# Patient Record
Sex: MALE | Race: WHITE | NOT HISPANIC OR LATINO | Employment: FULL TIME | URBAN - METROPOLITAN AREA
[De-identification: names, ages, dates, MRNs, and addresses within clinical notes are randomized per-mention and may not be internally consistent; named-entity substitution may affect disease eponyms.]

---

## 2019-01-22 ENCOUNTER — APPOINTMENT (EMERGENCY)
Dept: CT IMAGING | Facility: HOSPITAL | Age: 24
End: 2019-01-22
Payer: COMMERCIAL

## 2019-01-22 ENCOUNTER — HOSPITAL ENCOUNTER (EMERGENCY)
Facility: HOSPITAL | Age: 24
End: 2019-01-22
Attending: EMERGENCY MEDICINE
Payer: COMMERCIAL

## 2019-01-22 VITALS
HEIGHT: 69 IN | BODY MASS INDEX: 23.7 KG/M2 | HEART RATE: 66 BPM | SYSTOLIC BLOOD PRESSURE: 124 MMHG | RESPIRATION RATE: 18 BRPM | WEIGHT: 160 LBS | DIASTOLIC BLOOD PRESSURE: 59 MMHG | OXYGEN SATURATION: 100 % | TEMPERATURE: 97.8 F
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
## 2019-01-22 LAB
ANION GAP SERPL CALCULATED.3IONS-SCNC: 7 MMOL/L (ref 4–13)
BASOPHILS # BLD AUTO: 0.04 THOUSANDS/ΜL (ref 0–0.1)
BASOPHILS NFR BLD AUTO: 0 % (ref 0–1)
BUN SERPL-MCNC: 6 MG/DL (ref 5–25)
CALCIUM SERPL-MCNC: 9 MG/DL (ref 8.3–10.1)
CHLORIDE SERPL-SCNC: 101 MMOL/L (ref 100–108)
CO2 SERPL-SCNC: 30 MMOL/L (ref 21–32)
CREAT SERPL-MCNC: 0.78 MG/DL (ref 0.6–1.3)
EOSINOPHIL # BLD AUTO: 0.08 THOUSAND/ΜL (ref 0–0.61)
EOSINOPHIL NFR BLD AUTO: 1 % (ref 0–6)
ERYTHROCYTE [DISTWIDTH] IN BLOOD BY AUTOMATED COUNT: 12.8 % (ref 11.6–15.1)
GFR SERPL CREATININE-BSD FRML MDRD: 125 ML/MIN/1.73SQ M
GLUCOSE SERPL-MCNC: 92 MG/DL (ref 65–140)
HCT VFR BLD AUTO: 38 % (ref 36.5–49.3)
HGB BLD-MCNC: 12.8 G/DL (ref 12–17)
IMM GRANULOCYTES # BLD AUTO: 0.04 THOUSAND/UL (ref 0–0.2)
IMM GRANULOCYTES NFR BLD AUTO: 0 % (ref 0–2)
LYMPHOCYTES # BLD AUTO: 1.64 THOUSANDS/ΜL (ref 0.6–4.47)
LYMPHOCYTES NFR BLD AUTO: 16 % (ref 14–44)
MCH RBC QN AUTO: 28.8 PG (ref 26.8–34.3)
MCHC RBC AUTO-ENTMCNC: 33.7 G/DL (ref 31.4–37.4)
MCV RBC AUTO: 85 FL (ref 82–98)
MONOCYTES # BLD AUTO: 0.71 THOUSAND/ΜL (ref 0.17–1.22)
MONOCYTES NFR BLD AUTO: 7 % (ref 4–12)
NEUTROPHILS # BLD AUTO: 7.46 THOUSANDS/ΜL (ref 1.85–7.62)
NEUTS SEG NFR BLD AUTO: 76 % (ref 43–75)
NRBC BLD AUTO-RTO: 0 /100 WBCS
PLATELET # BLD AUTO: 190 THOUSANDS/UL (ref 149–390)
PMV BLD AUTO: 11.2 FL (ref 8.9–12.7)
POTASSIUM SERPL-SCNC: 4.1 MMOL/L (ref 3.5–5.3)
RBC # BLD AUTO: 4.45 MILLION/UL (ref 3.88–5.62)
SODIUM SERPL-SCNC: 138 MMOL/L (ref 136–145)
WBC # BLD AUTO: 9.97 THOUSAND/UL (ref 4.31–10.16)

## 2019-01-22 PROCEDURE — 99285 EMERGENCY DEPT VISIT HI MDM: CPT

## 2019-01-22 PROCEDURE — 36415 COLL VENOUS BLD VENIPUNCTURE: CPT | Performed by: PHYSICIAN ASSISTANT

## 2019-01-22 PROCEDURE — 74177 CT ABD & PELVIS W/CONTRAST: CPT

## 2019-01-22 PROCEDURE — 80048 BASIC METABOLIC PNL TOTAL CA: CPT | Performed by: PHYSICIAN ASSISTANT

## 2019-01-22 PROCEDURE — 71260 CT THORAX DX C+: CPT

## 2019-01-22 PROCEDURE — 85025 COMPLETE CBC W/AUTO DIFF WBC: CPT | Performed by: PHYSICIAN ASSISTANT

## 2019-01-22 RX ORDER — OXYCODONE HYDROCHLORIDE 5 MG/1
5 TABLET ORAL ONCE
Status: COMPLETED | OUTPATIENT
Start: 2019-01-22 | End: 2019-01-22

## 2019-01-22 RX ORDER — CYCLOBENZAPRINE HCL 10 MG
10 TABLET ORAL ONCE
Status: COMPLETED | OUTPATIENT
Start: 2019-01-22 | End: 2019-01-22

## 2019-01-22 RX ADMIN — OXYCODONE HYDROCHLORIDE 5 MG: 5 TABLET ORAL at 20:40

## 2019-01-22 RX ADMIN — IOHEXOL 100 ML: 350 INJECTION, SOLUTION INTRAVENOUS at 21:24

## 2019-01-22 RX ADMIN — CYCLOBENZAPRINE HYDROCHLORIDE 10 MG: 10 TABLET, FILM COATED ORAL at 20:40

## 2019-01-23 ENCOUNTER — HOSPITAL ENCOUNTER (OUTPATIENT)
Facility: HOSPITAL | Age: 24
Setting detail: OBSERVATION
Discharge: HOME | End: 2019-01-23
Attending: SURGERY | Admitting: SURGERY
Payer: COMMERCIAL

## 2019-01-23 VITALS
OXYGEN SATURATION: 100 % | TEMPERATURE: 97.9 F | HEART RATE: 64 BPM | RESPIRATION RATE: 18 BRPM | HEIGHT: 72 IN | WEIGHT: 159.8 LBS | BODY MASS INDEX: 21.64 KG/M2 | SYSTOLIC BLOOD PRESSURE: 105 MMHG | DIASTOLIC BLOOD PRESSURE: 63 MMHG
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
## 2019-01-23 DIAGNOSIS — R18.8 FREE FLUID IN PELVIS: ICD-10-CM
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
## 2019-01-23 LAB
ERYTHROCYTE [DISTWIDTH] IN BLOOD BY AUTOMATED COUNT: 13 % (ref 11.6–15.1)
HCT VFR BLD AUTO: 35.9 % (ref 36.5–49.3)
HCT VFR BLD AUTO: 37.5 % (ref 36.5–49.3)
HGB BLD-MCNC: 12.1 G/DL (ref 12–17)
HGB BLD-MCNC: 12.3 G/DL (ref 12–17)
MCH RBC QN AUTO: 28.9 PG (ref 26.8–34.3)
MCHC RBC AUTO-ENTMCNC: 32.8 G/DL (ref 31.4–37.4)
MCV RBC AUTO: 88 FL (ref 82–98)
PLATELET # BLD AUTO: 174 THOUSANDS/UL (ref 149–390)
PMV BLD AUTO: 11.9 FL (ref 8.9–12.7)
RBC # BLD AUTO: 4.26 MILLION/UL (ref 3.88–5.62)
WBC # BLD AUTO: 5.7 THOUSAND/UL (ref 4.31–10.16)

## 2019-01-23 PROCEDURE — 99219 PR INITIAL OBSERVATION CARE/DAY 50 MINUTES: CPT | Performed by: SURGERY

## 2019-01-23 PROCEDURE — 99285 EMERGENCY DEPT VISIT HI MDM: CPT

## 2019-01-23 PROCEDURE — 85018 HEMOGLOBIN: CPT | Performed by: SURGERY

## 2019-01-23 PROCEDURE — 85027 COMPLETE CBC AUTOMATED: CPT | Performed by: SURGERY

## 2019-01-23 PROCEDURE — 36415 COLL VENOUS BLD VENIPUNCTURE: CPT | Performed by: SURGERY

## 2019-01-23 PROCEDURE — 85014 HEMATOCRIT: CPT | Performed by: SURGERY

## 2019-01-23 RX ORDER — CLOTRIMAZOLE AND BETAMETHASONE DIPROPIONATE 10; .64 MG/G; MG/G
CREAM TOPICAL
COMMUNITY
Start: 2017-12-29

## 2019-01-23 RX ORDER — ARIPIPRAZOLE 2 MG/1
2 TABLET ORAL
COMMUNITY

## 2019-01-23 RX ORDER — AMOXICILLIN 250 MG
1 CAPSULE ORAL
Status: DISCONTINUED | OUTPATIENT
Start: 2019-01-23 | End: 2019-01-23 | Stop reason: HOSPADM

## 2019-01-23 RX ORDER — FAMOTIDINE 20 MG/1
40 TABLET, FILM COATED ORAL
Status: DISCONTINUED | OUTPATIENT
Start: 2019-01-23 | End: 2019-01-23 | Stop reason: HOSPADM

## 2019-01-23 RX ORDER — ESOMEPRAZOLE MAGNESIUM 40 MG/1
40 CAPSULE, DELAYED RELEASE ORAL 2 TIMES DAILY
COMMUNITY
End: 2020-03-01

## 2019-01-23 RX ORDER — ACETAMINOPHEN 325 MG/1
975 TABLET ORAL EVERY 8 HOURS PRN
Status: DISCONTINUED | OUTPATIENT
Start: 2019-01-23 | End: 2019-01-23 | Stop reason: HOSPADM

## 2019-01-23 RX ORDER — CLOTRIMAZOLE AND BETAMETHASONE DIPROPIONATE 10; .64 MG/G; MG/G
CREAM TOPICAL 2 TIMES DAILY
Status: DISCONTINUED | OUTPATIENT
Start: 2019-01-23 | End: 2019-01-23 | Stop reason: HOSPADM

## 2019-01-23 RX ORDER — ACETAMINOPHEN 325 MG/1
500 TABLET ORAL EVERY 4 HOURS PRN
Qty: 30 TABLET | Refills: 0
Start: 2019-01-23

## 2019-01-23 RX ORDER — SODIUM CHLORIDE 9 MG/ML
125 INJECTION, SOLUTION INTRAVENOUS CONTINUOUS
Status: DISCONTINUED | OUTPATIENT
Start: 2019-01-23 | End: 2019-01-23

## 2019-01-23 RX ORDER — NICOTINE 21 MG/24HR
1 PATCH, TRANSDERMAL 24 HOURS TRANSDERMAL DAILY
Status: DISCONTINUED | OUTPATIENT
Start: 2019-01-23 | End: 2019-01-23 | Stop reason: HOSPADM

## 2019-01-23 RX ORDER — RANITIDINE 300 MG/1
300 TABLET ORAL
COMMUNITY
End: 2020-03-01

## 2019-01-23 RX ORDER — ONDANSETRON 2 MG/ML
4 INJECTION INTRAMUSCULAR; INTRAVENOUS EVERY 8 HOURS PRN
Status: DISCONTINUED | OUTPATIENT
Start: 2019-01-23 | End: 2019-01-23 | Stop reason: HOSPADM

## 2019-01-23 RX ORDER — ESCITALOPRAM OXALATE 20 MG/1
20 TABLET ORAL DAILY
COMMUNITY
End: 2020-03-01

## 2019-01-23 RX ORDER — DOCUSATE SODIUM 100 MG/1
100 CAPSULE, LIQUID FILLED ORAL 2 TIMES DAILY
Status: DISCONTINUED | OUTPATIENT
Start: 2019-01-23 | End: 2019-01-23 | Stop reason: HOSPADM

## 2019-01-23 RX ORDER — PANTOPRAZOLE SODIUM 40 MG/1
40 TABLET, DELAYED RELEASE ORAL
Status: DISCONTINUED | OUTPATIENT
Start: 2019-01-23 | End: 2019-01-23 | Stop reason: HOSPADM

## 2019-01-23 RX ORDER — ESCITALOPRAM OXALATE 20 MG/1
20 TABLET ORAL DAILY
Status: DISCONTINUED | OUTPATIENT
Start: 2019-01-23 | End: 2019-01-23 | Stop reason: HOSPADM

## 2019-01-23 RX ADMIN — SODIUM CHLORIDE 125 ML/HR: 0.9 INJECTION, SOLUTION INTRAVENOUS at 02:16

## 2019-01-23 RX ADMIN — ACETAMINOPHEN 975 MG: 325 TABLET ORAL at 07:28

## 2019-01-23 RX ADMIN — ESCITALOPRAM OXALATE 20 MG: 20 TABLET ORAL at 10:17
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
## 2019-01-23 NOTE — SOCIAL WORK
Cm met with pt to discuss the role of CM  Pt lives with his mother in a 1 story home which has 10STE  Pt works, drives, and was fully independent PTA  Pt owns no DME or living will  Pt's [facility] is [facility]  Pt reports having depression and anxiety which is managed by his Psychiatrist Rita Tate  Pt reports no hx of substance abuse or IP rehab  Pt's mother will transport home  Pt provided CM with his medical insurance cards and [facility] claim, all of which were sent to billing  CM will follow up  CM reviewed d/c planning process including the following: identifying help at home, patient preference for d/c planning needs, Discharge Lounge, [facility] Meds to Bed program, availability of treatment team to discuss questions or concerns patient and/or family may have regarding understanding medications and recognizing signs and symptoms once discharged  CM also encouraged patient to follow up with all recommended appointments after discharge  Patient advised of importance for patient and family to participate in managing patients medical well being
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
## 2019-01-23 NOTE — ED NOTES
[facility]  hospitals-ED  Dr Jaspreet Mitchell is accepting   [address] [facility]  01/22/19 6526

## 2019-01-23 NOTE — ASSESSMENT & PLAN NOTE
CT chest abdomen pelvis demonstrated free fluid in pelvis and perisplenic area  Left-sided flank pain and left upper quadrant abdominal pain  H&H stable repeat 12 1 from 12 8  Pain management  Serial abdominal exams  Possible discharge today
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
## 2019-10-27 ENCOUNTER — HOSPITAL ENCOUNTER (EMERGENCY)
Facility: HOSPITAL | Age: 24
Discharge: HOME | End: 2019-10-28
Attending: EMERGENCY MEDICINE | Admitting: EMERGENCY MEDICINE
Payer: COMMERCIAL

## 2019-10-27 DIAGNOSIS — R07.89 CHEST WALL PAIN: ICD-10-CM

## 2019-10-27 DIAGNOSIS — F41.9 ANXIETY: Primary | ICD-10-CM

## 2019-10-27 PROCEDURE — 99285 EMERGENCY DEPT VISIT HI MDM: CPT | Performed by: EMERGENCY MEDICINE

## 2019-10-27 PROCEDURE — 93005 ELECTROCARDIOGRAM TRACING: CPT

## 2019-10-27 PROCEDURE — 99285 EMERGENCY DEPT VISIT HI MDM: CPT

## 2019-10-28 ENCOUNTER — APPOINTMENT (EMERGENCY)
Dept: RADIOLOGY | Facility: HOSPITAL | Age: 24
End: 2019-10-28
Payer: COMMERCIAL

## 2019-10-28 VITALS
HEART RATE: 88 BPM | TEMPERATURE: 97.1 F | DIASTOLIC BLOOD PRESSURE: 81 MMHG | RESPIRATION RATE: 16 BRPM | BODY MASS INDEX: 23.61 KG/M2 | SYSTOLIC BLOOD PRESSURE: 137 MMHG | WEIGHT: 174.1 LBS | OXYGEN SATURATION: 100 %

## 2019-10-28 LAB
ALBUMIN SERPL BCP-MCNC: 4.4 G/DL (ref 3–5.2)
ALP SERPL-CCNC: 56 U/L (ref 43–122)
ALT SERPL W P-5'-P-CCNC: 22 U/L (ref 9–52)
ANION GAP SERPL CALCULATED.3IONS-SCNC: 7 MMOL/L (ref 5–14)
AST SERPL W P-5'-P-CCNC: 22 U/L (ref 17–59)
ATRIAL RATE: 85 BPM
BILIRUB SERPL-MCNC: 0.3 MG/DL
BUN SERPL-MCNC: 15 MG/DL (ref 5–25)
CALCIUM SERPL-MCNC: 9.4 MG/DL (ref 8.4–10.2)
CHLORIDE SERPL-SCNC: 102 MMOL/L (ref 97–108)
CO2 SERPL-SCNC: 31 MMOL/L (ref 22–30)
CREAT SERPL-MCNC: 0.87 MG/DL (ref 0.7–1.5)
EOSINOPHIL # BLD AUTO: 0.44 THOUSAND/UL (ref 0–0.4)
EOSINOPHIL NFR BLD MANUAL: 7 % (ref 0–6)
ERYTHROCYTE [DISTWIDTH] IN BLOOD BY AUTOMATED COUNT: 13.6 %
GFR SERPL CREATININE-BSD FRML MDRD: 119 ML/MIN/1.73SQ M
GLUCOSE SERPL-MCNC: 92 MG/DL (ref 70–99)
HCT VFR BLD AUTO: 36 % (ref 41–53)
HGB BLD-MCNC: 12.3 G/DL (ref 13.5–17.5)
LIPASE SERPL-CCNC: 114 U/L (ref 23–300)
LYMPHOCYTES # BLD AUTO: 1.58 THOUSAND/UL (ref 0.5–4)
LYMPHOCYTES # BLD AUTO: 25 % (ref 25–45)
MCH RBC QN AUTO: 29.1 PG (ref 26–34)
MCHC RBC AUTO-ENTMCNC: 34.2 G/DL (ref 31–36)
MCV RBC AUTO: 85 FL (ref 80–100)
MONOCYTES # BLD AUTO: 0.25 THOUSAND/UL (ref 0.2–0.9)
MONOCYTES NFR BLD AUTO: 4 % (ref 1–10)
NEUTS SEG # BLD: 4.03 THOUSAND/UL (ref 1.8–7.8)
NEUTS SEG NFR BLD AUTO: 64 %
P AXIS: 67 DEGREES
PLATELET # BLD AUTO: 155 THOUSANDS/UL (ref 150–450)
PLATELET BLD QL SMEAR: ADEQUATE
PMV BLD AUTO: 10 FL (ref 8.9–12.7)
POTASSIUM SERPL-SCNC: 4 MMOL/L (ref 3.6–5)
PR INTERVAL: 154 MS
PROT SERPL-MCNC: 7.8 G/DL (ref 5.9–8.4)
QRS AXIS: 22 DEGREES
QRSD INTERVAL: 94 MS
QT INTERVAL: 378 MS
QTC INTERVAL: 449 MS
RBC # BLD AUTO: 4.23 MILLION/UL (ref 4.5–5.9)
RBC MORPH BLD: NORMAL
SODIUM SERPL-SCNC: 140 MMOL/L (ref 137–147)
T WAVE AXIS: 53 DEGREES
TOTAL CELLS COUNTED SPEC: 100
TROPONIN I SERPL-MCNC: <0.01 NG/ML (ref 0–0.03)
VENTRICULAR RATE: 85 BPM
WBC # BLD AUTO: 6.3 THOUSAND/UL (ref 4.5–11)

## 2019-10-28 PROCEDURE — 83690 ASSAY OF LIPASE: CPT | Performed by: EMERGENCY MEDICINE

## 2019-10-28 PROCEDURE — 85027 COMPLETE CBC AUTOMATED: CPT | Performed by: EMERGENCY MEDICINE

## 2019-10-28 PROCEDURE — 71046 X-RAY EXAM CHEST 2 VIEWS: CPT

## 2019-10-28 PROCEDURE — 85007 BL SMEAR W/DIFF WBC COUNT: CPT | Performed by: EMERGENCY MEDICINE

## 2019-10-28 PROCEDURE — 93010 ELECTROCARDIOGRAM REPORT: CPT | Performed by: INTERNAL MEDICINE

## 2019-10-28 PROCEDURE — 36415 COLL VENOUS BLD VENIPUNCTURE: CPT | Performed by: EMERGENCY MEDICINE

## 2019-10-28 PROCEDURE — 80053 COMPREHEN METABOLIC PANEL: CPT | Performed by: EMERGENCY MEDICINE

## 2019-10-28 PROCEDURE — 84484 ASSAY OF TROPONIN QUANT: CPT | Performed by: EMERGENCY MEDICINE

## 2019-10-28 RX ORDER — LORAZEPAM 0.5 MG/1
1 TABLET ORAL ONCE
Status: COMPLETED | OUTPATIENT
Start: 2019-10-28 | End: 2019-10-28

## 2019-10-28 RX ORDER — CLOTRIMAZOLE AND BETAMETHASONE DIPROPIONATE 10; .64 MG/G; MG/G
CREAM TOPICAL
COMMUNITY
Start: 2017-12-29 | End: 2020-03-01

## 2019-10-28 RX ORDER — BUSPIRONE HYDROCHLORIDE 10 MG/1
10 TABLET ORAL 2 TIMES DAILY
COMMUNITY

## 2019-10-28 RX ORDER — RANITIDINE 300 MG/1
TABLET ORAL
COMMUNITY
End: 2020-03-01

## 2019-10-28 RX ADMIN — LORAZEPAM 1 MG: 0.5 TABLET ORAL at 00:21

## 2019-10-28 NOTE — ED NOTES
Pt reports having chest pain today that started 2 hrs ago  Pt reports his pain is on his right side and worsens when palpated  He tells this nurse that he is slightly dizzy and feeling anxious as well  He had the same feeling on Wednesday when he was at work  After performing an EKG and placing the Pt on the cardiac monitor he asked for the remote to the TV  The Pt continued to turn through the channels while I did my assessment on him       Destin Peters RN  10/28/19 1002

## 2019-10-28 NOTE — ED PROVIDER NOTES
History  Chief Complaint   Patient presents with    Chest Pain     pt states that he started with CP 2 hours ago  pt states that he was walking around when it started  pt states that he had this same pain on wednesday at work  RN pressed on chest and pt reacted  pt denies taking any OTC meds  31 y/o W male c/o chest "tightness" along with weakness - began about two hours PTA  Patient states he has experienced similar symptoms in past   States he is also experiencing nausea along with associated dyspnea  No prior history of PE/DVT  Medical history noncontributory  Admits to smoking cigarettes  Afebrile; in NAD  Appears anxious  Prior to Admission Medications   Prescriptions Last Dose Informant Patient Reported? Taking? ARIPiprazole (ABILIFY) 2 mg tablet   Yes No   Sig: Take 2 mg by mouth daily   acetaminophen (TYLENOL) 325 mg tablet   No No   Sig: Take 1 5 tablets (488 mg total) by mouth every 4 (four) hours as needed for mild pain   busPIRone (BUSPAR) 10 mg tablet   Yes Yes   Sig: Take 10 mg by mouth 4 (four) times a day   clotrimazole-betamethasone (LOTRISONE) 1-0 05 % cream   Yes No   Sig: Apply topically   clotrimazole-betamethasone (LOTRISONE) 1-0 05 % cream Not Taking at Unknown time  Yes No   Sig: Apply topically   escitalopram (LEXAPRO) 20 mg tablet   Yes No   Sig: Take 20 mg by mouth daily   esomeprazole (NEXIUM) 40 MG capsule   Yes No   Sig: Take 40 mg by mouth 2 (two) times a day   ranitidine (ZANTAC) 300 MG tablet   Yes No   Sig: Take 300 mg by mouth daily after dinner   ranitidine (ZANTAC) 300 MG tablet   Yes No   Si tablet at bedtime      Facility-Administered Medications: None       Past Medical History:   Diagnosis Date    Anxiety     Anxiety     GERD (gastroesophageal reflux disease)        Past Surgical History:   Procedure Laterality Date    CHOLECYSTECTOMY         History reviewed  No pertinent family history    I have reviewed and agree with the history as documented  Social History     Tobacco Use    Smoking status: Current Some Day Smoker     Packs/day: 0 50     Types: Cigars    Smokeless tobacco: Never Used   Substance Use Topics    Alcohol use: Yes     Comment: occ    Drug use: No        Review of Systems   Respiratory: Positive for chest tightness and shortness of breath  Cardiovascular: Positive for chest pain  Gastrointestinal: Positive for nausea  Psychiatric/Behavioral: The patient is nervous/anxious  All other systems reviewed and are negative  Physical Exam  Physical Exam   Constitutional: He is oriented to person, place, and time  He appears well-developed and well-nourished  HENT:   Head: Normocephalic and atraumatic  Eyes: Pupils are equal, round, and reactive to light  EOM are normal    Neck: Normal range of motion  Neck supple  Cardiovascular: Normal rate, regular rhythm, intact distal pulses and normal pulses  Pulmonary/Chest: Effort normal and breath sounds normal    Abdominal: Soft  Bowel sounds are normal    Musculoskeletal: Normal range of motion  Right lower leg: Normal  He exhibits no tenderness and no edema  Left lower leg: Normal  He exhibits no tenderness and no edema  Neurological: He is alert and oriented to person, place, and time  Skin: Skin is warm and dry  Capillary refill takes less than 2 seconds  Psychiatric: His mood appears anxious  Nursing note and vitals reviewed        Vital Signs  ED Triage Vitals   Temperature Pulse Respirations Blood Pressure SpO2   10/27/19 2358 10/27/19 2358 10/27/19 2358 10/27/19 2358 10/27/19 2358   (!) 97 1 °F (36 2 °C) 86 18 136/72 100 %      Temp Source Heart Rate Source Patient Position - Orthostatic VS BP Location FiO2 (%)   10/27/19 2358 10/27/19 2358 10/27/19 2358 10/27/19 2358 --   Tympanic Monitor Sitting Left arm       Pain Score       10/28/19 0005       7           Vitals:    10/27/19 2358 10/28/19 0015   BP: 136/72 137/81   Pulse: 86 88 Patient Position - Orthostatic VS: Sitting Sitting         Visual Acuity      ED Medications  Medications   LORazepam (ATIVAN) tablet 1 mg (1 mg Oral Given 10/28/19 0021)       Diagnostic Studies  Results Reviewed     Procedure Component Value Units Date/Time    Troponin I [[id number]]  (Normal) Collected:  10/28/19 0016    Lab Status:  Final result Specimen:  Blood from Arm, Right Updated:  10/28/19 0043     Troponin I <0 01 ng/mL     Lipase [[id number]]  (Normal) Collected:  10/28/19 0016    Lab Status:  Final result Specimen:  Blood from Arm, Right Updated:  10/28/19 0032     Lipase 114 u/L     Comprehensive metabolic panel [[id number]]  (Abnormal) Collected:  10/28/19 0016    Lab Status:  Final result Specimen:  Blood from Arm, Right Updated:  10/28/19 0032     Sodium 140 mmol/L      Potassium 4 0 mmol/L      Chloride 102 mmol/L      CO2 31 mmol/L      ANION GAP 7 mmol/L      BUN 15 mg/dL      Creatinine 0 87 mg/dL      Glucose 92 mg/dL      Calcium 9 4 mg/dL      AST 22 U/L      ALT 22 U/L      Alkaline Phosphatase 56 U/L      Total Protein 7 8 g/dL      Albumin 4 4 g/dL      Total Bilirubin 0 30 mg/dL      eGFR 119 ml/min/1 73sq m     Narrative:       [place] guidelines for Chronic Kidney Disease (CKD):     Stage 1 with normal or high GFR (GFR > 90 mL/min/1 73 square meters)    Stage 2 Mild CKD (GFR = 60-89 mL/min/1 73 square meters)    Stage 3A Moderate CKD (GFR = 45-59 mL/min/1 73 square meters)    Stage 3B Moderate CKD (GFR = 30-44 mL/min/1 73 square meters)    Stage 4 Severe CKD (GFR = 15-29 mL/min/1 73 square meters)    Stage 5 End Stage CKD (GFR <15 mL/min/1 73 square meters)  Note: GFR calculation is accurate only with a steady state creatinine    CBC and differential [[id number]]  (Abnormal) Collected:  10/28/19 0016    Lab Status:  Final result Specimen:  Blood from Arm, Right Updated:  10/28/19 0028     WBC 6 30 Thousand/uL      RBC 4 23 Million/uL      Hemoglobin 12 3 g/dL      Hematocrit 36 0 %      MCV 85 fL      MCH 29 1 pg      MCHC 34 2 g/dL      RDW 13 6 %      MPV 10 0 fL      Platelets 733 Thousands/uL                  XR chest 2 views   ED Interpretation by Severo Aguirre DO (10/28 0015)   Normal 2V of chest                     Procedures  Procedures       ED Course  ED Course as of Oct 28 0046   Mon Oct 28, 2019   0005 EKG: nsr @ 85 bpm, no ST-T wave changes indicative of ischemia            HEART Risk Score      Most Recent Value   History  0 Filed at: 10/28/2019 0045   ECG  0 Filed at: 10/28/2019 0045   Age  0 Filed at: 10/28/2019 0045   Risk Factors  1 Filed at: 10/28/2019 0045   Troponin  0 Filed at: 10/28/2019 0045   Heart Score Risk Calculator   History  0 Filed at: 10/28/2019 0045   ECG  0 Filed at: 10/28/2019 0045   Age  0 Filed at: 10/28/2019 0045   Risk Factors  1 Filed at: 10/28/2019 0045   Troponin  0 Filed at: 10/28/2019 0045   HEART Score  1 Filed at: 10/28/2019 0045   HEART Score  1 Filed at: 10/28/2019 0045                    MARIA ALEJANDRA Risk Score      Most Recent Value   Age >= 72  0 Filed at: 10/28/2019 0045   Known CAD (stenosis >= 50%)  0 Filed at: 10/28/2019 0045   Recent (<=24 hrs) Service Angina  0 Filed at: 10/28/2019 0045   ST Deviation >= 0 5 mm  0 Filed at: 10/28/2019 0045   3+ CAD Risk Factors (FHx, HTN, HLP, DM, Smoker)  0 Filed at: 10/28/2019 0045   Aspirin Use Past 7 Days  0 Filed at: 10/28/2019 0045   Elevated Cardiac Markers  0 Filed at: 10/28/2019 0045   MARIA ALEJANDRA Risk Score (Calculated)  0 Filed at: 10/28/2019 0045        Wells' Criteria for PE      Most Recent Value   Nick' Criteria for PE   Clinical signs and symptoms of DVT  0 Filed at: 10/28/2019 0045   PE is primary diagnosis or equally likely  0 Filed at: 10/28/2019 0045   HR >100  0 Filed at: 10/28/2019 0045   Immobilization at least 3 days or Surgery in the previous 4 weeks  0 Filed at: 10/28/2019 0045   Previous, objectively diagnosed PE or DVT  0 Filed at: 10/28/2019 0045 Hemoptysis  0 Filed at: 10/28/2019 0045   Malignancy with treatment within 6 months or palliative  0 Filed at: 10/28/2019 0045   Archana Sing' Criteria Total  0 Filed at: 10/28/2019 0045            MDM    Disposition  Final diagnoses:   Anxiety   Chest wall pain     Time reflects when diagnosis was documented in both MDM as applicable and the Disposition within this note     Time User Action Codes Description Comment    10/28/2019 12:46 AM Oscar Croatian Add [F41 9] Anxiety     10/28/2019 12:46 AM Oscar Croatian Add [R07 89] Atypical chest pain     10/28/2019 12:46 AM Oscar Croatian Remove [R07 89] Atypical chest pain     10/28/2019 12:46 AM Oscar Croatian Add [R07 89] Chest wall pain       ED Disposition     ED Disposition Condition Date/Time Comment    Discharge Stable Mon Oct 28, 2019 12:46 AM Avery Mckoen discharge to home/self care  Follow-up Information     Follow up With Specialties Details Why [address] [place] [address] Rd, DO Internal Medicine Schedule an appointment as soon as possible for a visit in 1 week As needed [address] [address] [facility]  [phone]            Patient's Medications   Discharge Prescriptions    No medications on file     No discharge procedures on file      ED Provider  Electronically Signed by           Lance Glez DO  10/28/19 0743

## 2020-03-01 ENCOUNTER — HOSPITAL ENCOUNTER (EMERGENCY)
Facility: HOSPITAL | Age: 25
Discharge: HOME | End: 2020-03-01
Attending: EMERGENCY MEDICINE | Admitting: EMERGENCY MEDICINE
Payer: COMMERCIAL

## 2020-03-01 VITALS
OXYGEN SATURATION: 99 % | RESPIRATION RATE: 16 BRPM | BODY MASS INDEX: 23.03 KG/M2 | HEART RATE: 77 BPM | HEIGHT: 72 IN | DIASTOLIC BLOOD PRESSURE: 56 MMHG | TEMPERATURE: 98 F | WEIGHT: 170 LBS | SYSTOLIC BLOOD PRESSURE: 97 MMHG

## 2020-03-01 DIAGNOSIS — F32.A DEPRESSION WITH SUICIDAL IDEATION: Primary | ICD-10-CM

## 2020-03-01 DIAGNOSIS — R45.851 DEPRESSION WITH SUICIDAL IDEATION: Primary | ICD-10-CM

## 2020-03-01 DIAGNOSIS — F41.9 ANXIETY: ICD-10-CM

## 2020-03-01 LAB
AMPHETAMINES SERPL QL SCN: NEGATIVE
BARBITURATES UR QL: NEGATIVE
BENZODIAZ UR QL: NEGATIVE
COCAINE UR QL: NEGATIVE
ETHANOL EXG-MCNC: NORMAL MG/DL
METHADONE UR QL: NEGATIVE
OPIATES UR QL SCN: NEGATIVE
PCP UR QL: NEGATIVE
THC UR QL: NEGATIVE

## 2020-03-01 PROCEDURE — 80307 DRUG TEST PRSMV CHEM ANLYZR: CPT | Performed by: EMERGENCY MEDICINE

## 2020-03-01 PROCEDURE — 99284 EMERGENCY DEPT VISIT MOD MDM: CPT

## 2020-03-01 PROCEDURE — 99285 EMERGENCY DEPT VISIT HI MDM: CPT | Performed by: EMERGENCY MEDICINE

## 2020-03-01 PROCEDURE — 90715 TDAP VACCINE 7 YRS/> IM: CPT | Performed by: EMERGENCY MEDICINE

## 2020-03-01 PROCEDURE — 90471 IMMUNIZATION ADMIN: CPT

## 2020-03-01 PROCEDURE — 82075 ASSAY OF BREATH ETHANOL: CPT | Performed by: EMERGENCY MEDICINE

## 2020-03-01 RX ORDER — HYDROXYZINE PAMOATE 25 MG/1
25 CAPSULE ORAL 3 TIMES DAILY PRN
COMMUNITY

## 2020-03-01 RX ORDER — GINSENG 100 MG
1 CAPSULE ORAL ONCE
Status: COMPLETED | OUTPATIENT
Start: 2020-03-01 | End: 2020-03-01

## 2020-03-01 RX ORDER — VENLAFAXINE 75 MG/1
75 TABLET ORAL
COMMUNITY

## 2020-03-01 RX ORDER — OMEPRAZOLE 20 MG/1
20 CAPSULE, DELAYED RELEASE ORAL
COMMUNITY

## 2020-03-01 RX ADMIN — TETANUS TOXOID, REDUCED DIPHTHERIA TOXOID AND ACELLULAR PERTUSSIS VACCINE, ADSORBED 0.5 ML: 5; 2.5; 8; 8; 2.5 SUSPENSION INTRAMUSCULAR at 21:33

## 2020-03-01 RX ADMIN — BACITRACIN 1 SMALL APPLICATION: 500 OINTMENT TOPICAL at 21:34

## 2020-03-02 NOTE — ED NOTES
Pt presents to the ED with his mother s/p throwing a garbage can outside in anger and frustration  Pt denies any suicidal or homicidal thoughts, nor any auditory or visual hallucinations at this time  Pt admits to suicidal thoughts in the past but nothing currently and has never made any attempts  Pt has been hospitalized once previously in 2015 but has no current out-pt Tx due to some confusion over whether his insurance is active  Pt states this is his primary stressor as he has been on the phone with insurance for the past 2 months without any resolution  Pt adds that he also has some financial stress, although currrently resides with his parents  Pt currently receives out pt D & A Tx via [facility] monthly for Vivitrol shots  Pt denies any ETOH use for the past 7 5 months  Pt is currently on probation for a DUI from 2019, which will end in May of this year  Pt denies any Hx of abuse and neglect  Pt reports a good appetite and sleeps 9-10 hrs nightly, yet has frequent nightmares  CW discussed Tx options with pt and his mother, including [facility] vs out-pt Tx  Pt notes he will be starting a new job in 2 days and is reluctant to miss his first day, but ultimately agreed to go to [employer]  CW discussed this case with Dr Yvrose Lovett who is in agreement with this Tx plan

## 2020-03-02 NOTE — ED NOTES
Provider at bedside to discuss discharge home  Pt  Reports feeling safe to go home and pt 's parents in agreement       Johana Bolivar RN  03/01/20 1890

## 2020-03-02 NOTE — ED NOTES
Per [facility] pt is eligible for [facility]       Lola Roe, CLARICE  03/02/20 0792

## 2020-03-02 NOTE — ED NOTES
CW spoke with pt and his parents about sending [facility] a referral for pt to go to the [facility] tomorrow  Pt and his family are in agreement  CW relayed this Dr Davis Morse and pt's nurse Neyda Justice and Juma Lara [facility]  03/01/20 22:08

## 2020-03-02 NOTE — ED NOTES
CW spoke with [place] of [facility] who stated that although their are no beds available tonight, she expects some D/C'es tomorrow and encouraged CW to fax a referral for pt to her  CW will relay this to pt and Dr Macho Vann, [facility], [address]  03/01/20 21:41

## 2020-03-02 NOTE — ED PROVIDER NOTES
Pt Name: Graciela Antonio  MRN: [id number]  [place] 1995  Age/Sex: 30 y o  male  Date of evaluation: 3/1/2020  PCP: Elise Mancuso DO    CHIEF COMPLAINT    Chief Complaint   Patient presents with    Panic Attack     Patient stated that he had a panic attack today leading him to cut his left hand  Patient stated he is SI and has been off his medications for days  HPI    30 y o  male presenting with   Anxiety, depression, suicidal thoughts  Patient states he has been having difficulty with his insurance and was without his psychiatric meds over the past 4-5 days  Since then,  He states his anxiety has gotten worse, primarily centered around his difficulty with his insurance and obtaining his medications  He also complains of increasing suicidal thoughts although he has no plan and has no intent to carry out any of those thoughts  He denies homicidal ideation visual or auditory hallucinations  Patient states that he felt  His feelings building up and became angry and threw a trash can, accidentally cutting his hand in the process  He denies any other pain or symptoms  Patient had a prior inpatient [facility] hospitalization 5 years ago, no prior suicide attempts  HPI      Past Medical and Surgical History    Past Medical History:   Diagnosis Date    Anxiety     Anxiety     GERD (gastroesophageal reflux disease)        Past Surgical History:   Procedure Laterality Date    CHOLECYSTECTOMY         History reviewed  No pertinent family history  Social History     Tobacco Use    Smoking status: Current Some Day Smoker     Packs/day: 0 50     Types: Cigars    Smokeless tobacco: Never Used   Substance Use Topics    Alcohol use: Yes     Comment: occ    Drug use: No           Allergies    Allergies   Allergen Reactions    Food      Vomiting - with whole nuts, but can tolerate peanut butter - smooth  No anaphylaxis or airway swelling      Peanut Oil      Vomiting - with whole nuts, but can tolerate peanut butter - smooth  No anaphylaxis or airway swelling   Sulfa Antibiotics Hives       Home Medications    Prior to Admission medications    Medication Sig Start Date End Date Taking? Authorizing Provider   acetaminophen (TYLENOL) 325 mg tablet Take 1 5 tablets (488 mg total) by mouth every 4 (four) hours as needed for mild pain 1/23/19   Saray London PA-C   ARIPiprazole (ABILIFY) 2 mg tablet Take 2 mg by mouth daily    Historical Provider, MD   busPIRone (BUSPAR) 10 mg tablet Take 10 mg by mouth 4 (four) times a day    Historical Provider, MD   clotrimazole-betamethasone (LOTRISONE) 1-0 05 % cream Apply topically 12/29/17   Historical Provider, MD   clotrimazole-betamethasone (Keisha Base) 1-0 05 % cream Apply topically 12/29/17   Historical Provider, MD   escitalopram (LEXAPRO) 20 mg tablet Take 20 mg by mouth daily    Historical Provider, MD   esomeprazole (NEXIUM) 40 MG capsule Take 40 mg by mouth 2 (two) times a day    Historical Provider, MD   ranitidine (ZANTAC) 300 MG tablet Take 300 mg by mouth daily after dinner    Historical Provider, MD   ranitidine (ZANTAC) 300 MG tablet 1 tablet at bedtime    Historical Provider, MD           Review of Systems    Review of Systems   Constitutional: Negative for appetite change, chills and diaphoresis  HENT: Negative for drooling, facial swelling, trouble swallowing and voice change  Respiratory: Negative for apnea, shortness of breath and wheezing  Cardiovascular: Negative for chest pain and leg swelling  Gastrointestinal: Negative for abdominal distention, abdominal pain, diarrhea, nausea and vomiting  Genitourinary: Negative for dysuria and urgency  Musculoskeletal: Negative for arthralgias, back pain, gait problem and neck pain  Skin: Negative for color change, rash and wound  Neurological: Negative for seizures, speech difficulty, weakness and headaches     Psychiatric/Behavioral: Positive for dysphoric mood and suicidal ideas  Negative for agitation and behavioral problems  The patient is nervous/anxious  All other systems reviewed and negative  Physical Exam      ED Triage Vitals [03/01/20 1922]   Temperature Pulse Respirations Blood Pressure SpO2   98 °F (36 7 °C) 77 16 115/66 97 %      Temp Source Heart Rate Source Patient Position - Orthostatic VS BP Location FiO2 (%)   Oral Monitor Sitting Right arm --      Pain Score       --               Physical Exam   Constitutional: He is oriented to person, place, and time  He appears well-developed and well-nourished  HENT:   Head: Normocephalic and atraumatic  Nose: Nose normal    Mouth/Throat: Oropharynx is clear and moist    Eyes: Pupils are equal, round, and reactive to light  Conjunctivae and EOM are normal    Neck: Normal range of motion  Neck supple  No tracheal deviation present  Cardiovascular: Normal rate, regular rhythm, normal heart sounds and intact distal pulses  No murmur heard  Pulmonary/Chest: Effort normal and breath sounds normal  No stridor  No respiratory distress  He has no wheezes  He has no rales  Abdominal: Soft  He exhibits no distension  There is no tenderness  There is no rebound and no guarding  Musculoskeletal: Normal range of motion  He exhibits no edema or deformity  Neurological: He is alert and oriented to person, place, and time  No cranial nerve deficit  He exhibits normal muscle tone  Skin: Skin is warm and dry  No rash noted  Psychiatric:    Somewhat dysphoric mood but normal affect, patient behaving normally, cooperative with exam, speaking calmly incoherently, displays linear thinking as well as good judgment in  Seeking care, requesting help navigating insurance situation   Nursing note and vitals reviewed             Diagnostic Results      Labs:    Results Reviewed     Procedure Component Value Units Date/Time    Rapid drug screen, urine [[id number]]  (Normal) Collected:  03/01/20 2124    Lab Status: Final result Specimen:  Urine, Clean Catch Updated:  03/01/20 2144     Amph/Meth UR Negative     Barbiturate Ur Negative     Benzodiazepine Urine Negative     Cocaine Urine Negative     Methadone Urine Negative     Opiate Urine Negative     PCP Ur Negative     THC Urine Negative    Narrative:       FOR MEDICAL PURPOSES ONLY  IF CONFIRMATION NEEDED PLEASE CONTACT THE LAB WITHIN 5 DAYS  Drug Screen Cutoff Levels:  AMPHETAMINE/METHAMPHETAMINES  1000 ng/mL  BARBITURATES     200 ng/mL  BENZODIAZEPINES     200 ng/mL  COCAINE      300 ng/mL  METHADONE      300 ng/mL  OPIATES      300 ng/mL  PHENCYCLIDINE     25 ng/mL  THC       50 ng/mL      POCT alcohol breath test [[id number]]  (Normal) Resulted:  03/01/20 2059    Lab Status:  Final result Updated:  03/01/20 2059     EXTBreath Alcohol 0 000%          All labs reviewed and utilized in the medical decision making process    Radiology:    No orders to display       All radiology studies independently viewed by me and interpreted by the radiologist     Procedure    Procedures        ED Course of Care and Re-Assessments        Patient's tetanus updated, wound dressed with bacitracin  Crisis consult, patient seen and examined by same  Medications   tetanus-diphtheria-acellular pertussis (BOOSTRIX) IM injection 0 5 mL (0 5 mL Intramuscular Given 3/1/20 2133)   bacitracin topical ointment 1 small application (1 small application Topical Given 3/1/20 2134)           FINAL IMPRESSION    Final diagnoses:   Anxiety   Depression with suicidal ideation         DISPOSITION/PLAN      Depression with suicidal ideation as well as anxiety as above  No intent or plan, the patient has excellent support from his parents with whom he lives, all parties comfortable with outpatient treatment patient not desiring inpatient treatment at this time, does not  Meet criteria for 302   At this time   Insurance status confirmed by registration and patient is insured,   Easing some of his anxiety  After discussion with crisis, plan formed for  [facility], to be started within the next 1-2 days  All parties comfortable with this plan, discharged strict return precautions, follow up with [facility],   Encouraged to return if his symptoms worsen or he changes his mind  Time reflects when diagnosis was documented in both MDM as applicable and the Disposition within this note     Time User Action Codes Description Comment    3/1/2020 10:03 PM Honey Medici Add [F41 9] Anxiety     3/1/2020 10:03 PM Honey Medici Add [F32 9,  S75 230] Depression with suicidal ideation     3/1/2020 10:03 PM Nobie Enma T Modify [F41 9] Anxiety     3/1/2020 10:03 PM Honey Medici Modify [F32 9,  R24 230] Depression with suicidal ideation       ED Disposition     ED Disposition Condition Date/Time Comment    Discharge Stable Sun Mar 1, 2020 10:03 PM Josiane Mcfarland discharge to home/self care              MD Documentation      Most Recent Value   Sending MD Dr Hammad Marrero up With Specialties Details Why Contact Info Additional DO Emma Internal Medicine Call in 1 day To discuss this visit [address] [phone]       [address] [place] [address] Emergency Department Emergency Medicine Go to  If symptoms worsen [address] [facility] 31 58 35 [place] ED, [address] [place], [place], [address] [facility] [address]  Call in 1 day To establish care as soon as possible [address]  [phone]             PATIENT REFERRED TO:    Gavino Clark DO  [address] [address] [facility]  [phone]    Call in 1 day  To discuss this visit    [address] [place] [address] Emergency Department  [address] [facility] [id number] [phone]  Go to   If symptoms worsen    [facility]  [address] [place] [address] [address]  [phone]  Call in 1 day  To establish care as soon as possible      DISCHARGE MEDICATIONS:    Discharge Medication List as of 3/1/2020 10:06 PM      CONTINUE these medications which have NOT CHANGED    Details   acetaminophen (TYLENOL) 325 mg tablet Take 1 5 tablets (488 mg total) by mouth every 4 (four) hours as needed for mild pain, Starting Wed 1/23/2019, No Print      ARIPiprazole (ABILIFY) 2 mg tablet Take 2 mg by mouth daily at bedtime , Historical Med      hydrOXYzine pamoate (VISTARIL) 25 mg capsule Take 25 mg by mouth 3 (three) times a day as needed for itching, Historical Med      omeprazole (PriLOSEC) 20 mg delayed release capsule Take 20 mg by mouth daily in the early morning, Historical Med      venlafaxine (EFFEXOR) 75 mg tablet Take 75 mg by mouth daily in the early morning, Historical Med      busPIRone (BUSPAR) 10 mg tablet Take 10 mg by mouth 2 (two) times a day , Historical Med      clotrimazole-betamethasone (LOTRISONE) 1-0 05 % cream Apply topically, Starting Fri 12/29/2017, Historical Med             No discharge procedures on file           MD Susie Vasquez MD  03/01/20 1960

## 2022-01-14 ENCOUNTER — APPOINTMENT (EMERGENCY)
Dept: RADIOLOGY | Facility: HOSPITAL | Age: 27
End: 2022-01-14
Payer: COMMERCIAL

## 2022-01-14 ENCOUNTER — HOSPITAL ENCOUNTER (EMERGENCY)
Facility: HOSPITAL | Age: 27
Discharge: HOME | End: 2022-01-14
Attending: EMERGENCY MEDICINE
Payer: COMMERCIAL

## 2022-01-14 VITALS
RESPIRATION RATE: 16 BRPM | DIASTOLIC BLOOD PRESSURE: 69 MMHG | OXYGEN SATURATION: 97 % | BODY MASS INDEX: 29.62 KG/M2 | WEIGHT: 200 LBS | TEMPERATURE: 97.7 F | SYSTOLIC BLOOD PRESSURE: 122 MMHG | HEART RATE: 77 BPM | HEIGHT: 69 IN
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
## 2022-01-14 DIAGNOSIS — R07.89 CHEST WALL PAIN: Primary | ICD-10-CM

## 2022-01-14 PROCEDURE — 71046 X-RAY EXAM CHEST 2 VIEWS: CPT

## 2022-01-14 PROCEDURE — 99285 EMERGENCY DEPT VISIT HI MDM: CPT

## 2022-01-14 PROCEDURE — 93005 ELECTROCARDIOGRAM TRACING: CPT

## 2022-01-14 RX ORDER — ACETAMINOPHEN 325 MG/1
650 TABLET ORAL ONCE
Status: COMPLETED | OUTPATIENT
Start: 2022-01-14 | End: 2022-01-14

## 2022-01-14 RX ORDER — IBUPROFEN 400 MG/1
400 TABLET ORAL ONCE
Status: COMPLETED | OUTPATIENT
Start: 2022-01-14 | End: 2022-01-14

## 2022-01-14 RX ADMIN — IBUPROFEN 400 MG: 400 TABLET ORAL at 20:14

## 2022-01-14 RX ADMIN — ACETAMINOPHEN 650 MG: 325 TABLET, FILM COATED ORAL at 20:15

## 2022-01-15 LAB
ATRIAL RATE: 75 BPM
P AXIS: 63 DEGREES
PR INTERVAL: 154 MS
QRS AXIS: 37 DEGREES
QRSD INTERVAL: 88 MS
QT INTERVAL: 384 MS
QTC INTERVAL: 428 MS
T WAVE AXIS: 38 DEGREES
VENTRICULAR RATE: 75 BPM

## 2022-01-15 PROCEDURE — 93010 ELECTROCARDIOGRAM REPORT: CPT | Performed by: INTERNAL MEDICINE
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
## 2022-01-15 NOTE — DISCHARGE INSTRUCTIONS
Please return in the emergency department for any worsening of your symptoms including increased chest pain, shortness of breath passing out, vomiting without relief  He can use Tylenol and Advil as needed for your pain  Please follow-up your family doctor as needed    Head injury instructions have been provided for you